# Patient Record
Sex: MALE | Race: AMERICAN INDIAN OR ALASKA NATIVE
[De-identification: names, ages, dates, MRNs, and addresses within clinical notes are randomized per-mention and may not be internally consistent; named-entity substitution may affect disease eponyms.]

---

## 2018-06-11 ENCOUNTER — HOSPITAL ENCOUNTER (EMERGENCY)
Dept: HOSPITAL 5 - ED | Age: 48
Discharge: HOME | End: 2018-06-11
Payer: SELF-PAY

## 2018-06-11 VITALS — DIASTOLIC BLOOD PRESSURE: 87 MMHG | SYSTOLIC BLOOD PRESSURE: 137 MMHG

## 2018-06-11 DIAGNOSIS — M54.5: Primary | ICD-10-CM

## 2018-06-11 DIAGNOSIS — Z79.899: ICD-10-CM

## 2018-06-11 DIAGNOSIS — F17.200: ICD-10-CM

## 2018-06-11 LAB
BACTERIA #/AREA URNS HPF: (no result) /HPF
BASOPHILS # (AUTO): 0 K/MM3 (ref 0–0.1)
BASOPHILS NFR BLD AUTO: 0.4 % (ref 0–1.8)
BILIRUB UR QL STRIP: (no result)
BLOOD UR QL VISUAL: (no result)
BUN SERPL-MCNC: 13 MG/DL (ref 9–20)
BUN/CREAT SERPL: 14 %
CALCIUM SERPL-MCNC: 9.1 MG/DL (ref 8.4–10.2)
EOSINOPHIL # BLD AUTO: 0.1 K/MM3 (ref 0–0.4)
EOSINOPHIL NFR BLD AUTO: 0.9 % (ref 0–4.3)
HCT VFR BLD CALC: 45.5 % (ref 35.5–45.6)
HEMOLYSIS INDEX: 3
HGB BLD-MCNC: 15.2 GM/DL (ref 11.8–15.2)
LYMPHOCYTES # BLD AUTO: 2.6 K/MM3 (ref 1.2–5.4)
LYMPHOCYTES NFR BLD AUTO: 29.4 % (ref 13.4–35)
MCH RBC QN AUTO: 30 PG (ref 28–32)
MCHC RBC AUTO-ENTMCNC: 33 % (ref 32–34)
MCV RBC AUTO: 90 FL (ref 84–94)
MONOCYTES # (AUTO): 0.9 K/MM3 (ref 0–0.8)
MONOCYTES % (AUTO): 10.1 % (ref 0–7.3)
MUCOUS THREADS #/AREA URNS HPF: (no result) /HPF
PH UR STRIP: 6 [PH] (ref 5–7)
PLATELET # BLD: 344 K/MM3 (ref 140–440)
PROT UR STRIP-MCNC: (no result) MG/DL
RBC # BLD AUTO: 5.08 M/MM3 (ref 3.65–5.03)
RBC #/AREA URNS HPF: 2 /HPF (ref 0–6)
UROBILINOGEN UR-MCNC: 2 MG/DL (ref ?–2)
WBC #/AREA URNS HPF: 2 /HPF (ref 0–6)

## 2018-06-11 PROCEDURE — 81001 URINALYSIS AUTO W/SCOPE: CPT

## 2018-06-11 PROCEDURE — 85025 COMPLETE CBC W/AUTO DIFF WBC: CPT

## 2018-06-11 PROCEDURE — 82550 ASSAY OF CK (CPK): CPT

## 2018-06-11 PROCEDURE — 99284 EMERGENCY DEPT VISIT MOD MDM: CPT

## 2018-06-11 PROCEDURE — 74176 CT ABD & PELVIS W/O CONTRAST: CPT

## 2018-06-11 PROCEDURE — 80048 BASIC METABOLIC PNL TOTAL CA: CPT

## 2018-06-11 PROCEDURE — 36415 COLL VENOUS BLD VENIPUNCTURE: CPT

## 2018-06-11 NOTE — CAT SCAN REPORT
CT ABDOMEN PELVIS WITHOUT CONTRAST:



HISTORY:  Right flank pain.



COMPARISON: none.



TECHNIQUE:  Helical CT in 1.25mm intervals without IV contrast. 

Sagittal and coronal reconstructions. 





FINDINGS:



Lung bases: Normal.



Liver: Normal.



Biliary system: Normal.



Pancreas: Normal.



Spleen: Normal.



Kidneys/ureters/bladder: Normal.



Adrenal glands: Normal.



Aorta: Normal.



Intestines: Normal.



Appendix: Normal.



Pelvic viscera: Normal.



Ascites: None.



Adenopathy: None.



Musculoskeletal: Normal.





IMPRESSION:

Unremarkable CT scan of the abdomen and pelvis without contrast.

## 2018-06-11 NOTE — EMERGENCY DEPARTMENT REPORT
ED Back Pain/Injury HPI





- General


Chief Complaint: Back Pain/Injury


Stated Complaint: BACK PAIN


Time Seen by Provider: 18 14:48


Source: patient


Limitations: No Limitations





- History of Present Illness


Initial Comments: 


47-year-old male past medical history none presents with complaint of several 

months of persistent right-sided lower back pain.  Denies any fevers chills 

nausea vomiting dysuria hematuria or increased urinary frequency.  Patient 

states he has been taking Aleve on a daily basis with minimal relief of his pain


MD Complaint: back pain


Onset/Timin


-: month(s)


Severity: moderate


Severity scale (0 -10): 6


Quality: aching


Consistency: constant


Improves With: none


Context: while lifting, turning/twisting, bending


Associated Symptoms: denies other symptoms





- Related Data


 Previous Rx's











 Medication  Instructions  Recorded  Last Taken  Type


 


Cyclobenzaprine [Flexeril] 10 mg PO BID PRN #10 tablet 11/15/17 Unknown Rx


 


Ibuprofen [Motrin] 600 mg PO Q8H PRN #30 tablet 11/15/17 Unknown Rx


 


Sulfamethoxazole/Trimethoprim 1 each PO BID #14 tablet 11/15/17 Unknown Rx





[Bactrim DS TAB]    


 


Acetaminophen/Codeine [Tylenol 1 tab PO Q6H PRN #5 tab 18 Unknown Rx





/Codeine # 3 tab]    


 


Cyclobenzaprine [Flexeril] 10 mg PO TID PRN #15 tablet 18 Unknown Rx











 Allergies











Allergy/AdvReac Type Severity Reaction Status Date / Time


 


No Known Allergies Allergy   Unverified 11/15/17 10:11














ED Review of Systems


ROS: 


Stated complaint: BACK PAIN


Other details as noted in HPI





Constitutional: denies: chills, fever


Eyes: denies: eye pain, eye discharge, vision change


ENT: denies: ear pain, throat pain


Respiratory: denies: cough, shortness of breath, wheezing


Cardiovascular: denies: chest pain, palpitations


Endocrine: no symptoms reported


Gastrointestinal: denies: abdominal pain, nausea, diarrhea


Genitourinary: denies: urgency, dysuria


Musculoskeletal: back pain.  denies: joint swelling, arthralgia


Skin: denies: rash, lesions


Neurological: denies: headache, weakness, paresthesias


Psychiatric: denies: anxiety, depression


Hematological/Lymphatic: denies: easy bleeding, easy bruising





ED Past Medical Hx





- Past Medical History


Previous Medical History?: No





- Surgical History


Past Surgical History?: No





- Social History


Smoking Status: Current Every Day Smoker


Substance Use Type: Alcohol





- Medications


Home Medications: 


 Home Medications











 Medication  Instructions  Recorded  Confirmed  Last Taken  Type


 


Cyclobenzaprine [Flexeril] 10 mg PO BID PRN #10 tablet 11/15/17  Unknown Rx


 


Ibuprofen [Motrin] 600 mg PO Q8H PRN #30 tablet 11/15/17  Unknown Rx


 


Sulfamethoxazole/Trimethoprim 1 each PO BID #14 tablet 11/15/17  Unknown Rx





[Bactrim DS TAB]     


 


Acetaminophen/Codeine [Tylenol 1 tab PO Q6H PRN #5 tab 18  Unknown Rx





/Codeine # 3 tab]     


 


Cyclobenzaprine [Flexeril] 10 mg PO TID PRN #15 tablet 18  Unknown Rx














ED Physical Exam





- General


Limitations: No Limitations


General appearance: alert, in no apparent distress





- Head


Head exam: Present: atraumatic, normocephalic





- Eye


Eye exam: Present: normal appearance, PERRL, EOMI





- ENT


ENT exam: Present: mucous membranes moist





- Neck


Neck exam: Present: normal inspection





- Respiratory


Respiratory exam: Present: normal lung sounds bilaterally.  Absent: respiratory 

distress





- Cardiovascular


Cardiovascular Exam: Present: regular rate, normal rhythm.  Absent: systolic 

murmur, diastolic murmur, rubs, gallop





- GI/Abdominal


GI/Abdominal exam: Present: soft (abdomen soft nontender nondistended), normal 

bowel sounds





- Rectal


Rectal exam: Present: deferred





- Extremities Exam


Extremities exam: Present: normal inspection





- Back Exam


Back exam: Present: normal inspection, paraspinal tenderness





- Neurological Exam


Neurological exam: Present: alert, oriented X3, CN II-XII intact, normal gait





- Psychiatric


Psychiatric exam: Present: normal affect, normal mood





- Skin


Skin exam: Present: warm, dry, intact, normal color.  Absent: rash





ED Course


 Vital Signs











  18





  13:32


 


Temperature 98.3 F


 


Pulse Rate 93 H


 


Respiratory 18





Rate 


 


Blood Pressure 137/87


 


O2 Sat by Pulse 99





Oximetry 














ED Medical Decision Making





- Lab Data


Result diagrams: 


 18 15:04





 18 15:04





- Medical Decision Making


A/P: Musculoskeletal back


1-imaging unremarkable, BMP normal.  BUN/creatinine is normal


2-course of Flexeril, analgesics when necessary.  Advised patient to not take 

more than daily allotted limit of NSAIDs as he states he has been taking them 

on a daily basis for several months


3-follow-up with primary care





Critical care attestation.: 


If time is entered above; I have spent that time in minutes in the direct care 

of this critically ill patient, excluding procedure time.








ED Disposition


Clinical Impression: 


Back pain


Qualifiers:


 Back pain location: low back pain Chronicity: acute Back pain laterality: 

unspecified Sciatica presence: without sciatica Qualified Code(s): M54.5 - Low 

back pain





Disposition:  TO HOME OR SELFCARE


Is pt being admited?: No


Does the pt Need Aspirin: No


Condition: Stable


Instructions:  Back Pain (ED), Chronic Back Pain (ED), Low Back Strain (ED)


Prescriptions: 


Acetaminophen/Codeine [Tylenol /Codeine # 3 tab] 1 tab PO Q6H PRN #5 tab


 PRN Reason: Pain


Cyclobenzaprine [Flexeril] 10 mg PO TID PRN #15 tablet


 PRN Reason: Muscle Spasm


Referrals: 


[facility] [Provider Group] - 3-5 Days


[facility]UR[facility] SHEL[facility] MD [Staff Physician] - 3-5 Days


Forms:  Work/School Release Form(ED)


Time of Disposition: 16:20

## 2018-06-15 ENCOUNTER — HOSPITAL ENCOUNTER (EMERGENCY)
Dept: HOSPITAL 5 - ED | Age: 48
Discharge: LEFT BEFORE DISCHARGE | End: 2018-06-15
Payer: COMMERCIAL

## 2018-06-15 VITALS — SYSTOLIC BLOOD PRESSURE: 127 MMHG | DIASTOLIC BLOOD PRESSURE: 82 MMHG

## 2018-06-15 DIAGNOSIS — G89.29: ICD-10-CM

## 2018-06-15 DIAGNOSIS — M54.5: Primary | ICD-10-CM

## 2018-06-15 PROCEDURE — 99281 EMR DPT VST MAYX REQ PHY/QHP: CPT

## 2018-06-15 NOTE — EMERGENCY DEPARTMENT REPORT
Chief Complaint: Back Pain/Injury


Stated Complaint: BACK PAIN


Time Seen by Provider: 06/15/18 10:46





- HPI


History of Present Illness: 





This is a 47 y.o. -American male who presents with chronic low back 

pain.  Patient was seen here on June 11 and referred to [facility] for follow-up.  Patient went to [facility] this morning 

and given a appointment for June 29, 2018.  Patient reports pain is unbearable 

and unable to wait until June 29 and requests refills of Flexeril and Tylenol 

3.  On assessment there is no acute findings of paresthesia or paralysis. Will 

screening out for f/u with [facility] on 6/29/2018.





- ROS


Review of Systems: 





bilateral low back pain





- Exam


Vital Signs: 


 Vital Signs











  06/15/18





  10:39


 


Temperature 98.8 F


 


Pulse Rate 110 H


 


Respiratory 16





Rate 


 


Blood Pressure 127/82


 


O2 Sat by Pulse 98





Oximetry 











Physical Exam: 





no paresthesia or paralysis, negative bilateral CVA tenderness, FROM


MSE screening note: 


Focused history and physical exam performed.


Due to findings the following was ordered:





Patient MSE screened out to [facility] for follow-up and 

management of chronic pain.  Instructed to take over-the-counter naproxen or 

ibuprofen to help manage pain until June 29 appointment.





ED Disposition for MSE


Disposition: Z-07 MED SCREENING EXAM-LEFT


Condition: Stable


Referrals: 


[facility],MD [Primary Care Provider] - 3-5 Days

## 2020-01-28 ENCOUNTER — HOSPITAL ENCOUNTER (EMERGENCY)
Dept: HOSPITAL 5 - ED | Age: 50
Discharge: LEFT BEFORE DISCHARGE | End: 2020-01-28
Payer: SELF-PAY

## 2020-01-28 VITALS — DIASTOLIC BLOOD PRESSURE: 88 MMHG | SYSTOLIC BLOOD PRESSURE: 152 MMHG

## 2020-01-28 DIAGNOSIS — M54.5: Primary | ICD-10-CM

## 2020-01-28 PROCEDURE — 99282 EMERGENCY DEPT VISIT SF MDM: CPT

## 2020-01-28 NOTE — EVENT NOTE
ED Screening Note


Date of service: 01/28/20


Time: 14:40


ED Screening Note: 


49 y o male presents with low back pain intermittent x 2 years


denies injury, trauma falls or any urinary symptoms








This initial assessment/diagnostic orders/clinical plan/treatment(s) is/are 

subject to change based on patients health status, clinical progression and re-

assessment by fellow clinical providers in the ED. Further treatment and workup 

at subsequent clinical providers discretion. Patient/guardian urged not to elope

from the ED as their condition may be serious if not clinically assessed and 

managed. 





Initial orders include: 


Pt presents with a non-medical emergency


Examination is normal,


Vital sign are stable


Pt given information for clinics to follow up with  Dr Ruiz today for 

further treatment and evaluation


Also discussed strict return precautions in detail with pt who verbalized unders

tanding